# Patient Record
Sex: FEMALE | Race: WHITE | NOT HISPANIC OR LATINO | URBAN - METROPOLITAN AREA
[De-identification: names, ages, dates, MRNs, and addresses within clinical notes are randomized per-mention and may not be internally consistent; named-entity substitution may affect disease eponyms.]

---
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
## 2021-05-26 NOTE — ED PROVIDER NOTE - CONSTITUTIONAL NEGATIVE STATEMENT, MLM
[facility]  [facility]  [address]  [address]  Telephone:  [phone]      5/26/2021    RE: Dipti Cutler, 1992      To whom it may concern;    Dipti is under my care for pregnancy with an estimated delivery date of 9/13/2021.    Dipti is seeking dental care. If treatment is necessary I recommend the following:    · Avoid X-rays, but if necessary, please shield the patient's abdomen with one lead apron.  · If anesthesia is needed, please use a local anesthetic.  · If antibiotics are needed, she may receive  Penicillin, Cephalosporins, Amoxicillin or Clindamycin, provided that she is not allergic.  · If pain medications are necessary, she may take Tylenol ES as directed, PRN. For severe pain, Tylenol #3, Vicodin or Percocet can be prescribed.  ·  Minor dental procedures can be done if the above guidelines are followed. Procedures are up to the discretion of the dentist.    For further questions, please feel free to call our office at [phone]. Thank you.    Sincerely,      Brandy MOREIRA for Linnette Colby NP                     .                                  
+fever/chills

## 2023-02-02 PROBLEM — Z00.00 ENCOUNTER FOR PREVENTIVE HEALTH EXAMINATION: Status: ACTIVE | Noted: 2023-02-02

## 2023-02-07 ENCOUNTER — APPOINTMENT (OUTPATIENT)
Dept: RHEUMATOLOGY | Facility: CLINIC | Age: 23
End: 2023-02-07
Payer: COMMERCIAL

## 2023-02-07 VITALS
WEIGHT: 160 LBS | DIASTOLIC BLOOD PRESSURE: 80 MMHG | SYSTOLIC BLOOD PRESSURE: 120 MMHG | BODY MASS INDEX: 29.44 KG/M2 | HEART RATE: 107 BPM | OXYGEN SATURATION: 99 % | HEIGHT: 62 IN

## 2023-02-07 DIAGNOSIS — Z78.9 OTHER SPECIFIED HEALTH STATUS: ICD-10-CM

## 2023-02-07 DIAGNOSIS — M54.50 LOW BACK PAIN, UNSPECIFIED: ICD-10-CM

## 2023-02-07 DIAGNOSIS — Z80.9 FAMILY HISTORY OF MALIGNANT NEOPLASM, UNSPECIFIED: ICD-10-CM

## 2023-02-07 DIAGNOSIS — Z82.61 FAMILY HISTORY OF ARTHRITIS: ICD-10-CM

## 2023-02-07 PROCEDURE — 99204 OFFICE O/P NEW MOD 45 MIN: CPT

## 2023-02-07 RX ORDER — CETIRIZINE HCL 10 MG
10 TABLET ORAL
Refills: 0 | Status: ACTIVE | COMMUNITY

## 2023-02-07 RX ORDER — ERGOCALCIFEROL 1.25 MG/1
1.25 MG CAPSULE, LIQUID FILLED ORAL
Refills: 0 | Status: ACTIVE | COMMUNITY

## 2023-02-07 NOTE — REASON FOR VISIT
[Initial Evaluation] : an initial evaluation [FreeTextEntry1] : Pain in the low back, hips, knees and elbows x 1.5 years

## 2023-02-07 NOTE — HISTORY OF PRESENT ILLNESS
[FreeTextEntry1] : In May 2021, at the end of her dino year of college, pt developed pain in her knee. She went to an urgent care, was told it was bursitis, then saw two orthopedic surgeons and had an MRI of her knee, was told she has patellofemoral syndrome by one and a congenital abnormality by another, and was sent to physical therapy. However, she subsequently developed pain in her hips, ankles, and low back, and then pain in her elbows. She thinks the rest of the pain may have been exacerbated by PT exercises. She saw another orthopedic surgeon, was told she has ligamentous laxity and was sent for additional PT, which she thinks has helped somewhat but sometimes she has flares of pain. Generally, the pain is worse after activity, although sometimes she has elbow pain when she wakes up. + Worse knee pain with standing. Sometimes, she feels knee and hip pressure when walking, like her joints will dislocate. + Fatigue. + Baseline high temperature for at least a few years, sometimes up to 101F. Pt denies difficulty sleeping, Raynaud's, rashes. + Occasional dry mouth. + IBS.\par \par Physical exam: GEN: Pleasant, AAO woman sitting on exam table in NAD\par SKIN: no rashes\par HEAD: no alopecia\par MOUTH: Moist mucous membranes, no oral ulcers, normal oral aperture\par ENT: no LAD\par PULM: Clear to auscultation b/l\par CV: Regular rate and rhythm, no murmurs\par MSK:\par Shoulders: Full ROM b/l\par Elbows: + Hypermobility b/l, no effusions\par Wrists: Full ROM b/l, no effusions\par Hands: + Hypermobility in thumbs b/l\par Hips: Full ROM b/l\par Knees: no effusions, full ROM b/l, no hypermobility\par Ankles: no effusions, full ROM b/l\par Feet: no effusions, no TTP\par Back: no hypermobility\par EXT: Normal nailfold capillaries, no nail changes

## 2023-02-07 NOTE — ASSESSMENT
[FreeTextEntry1] : Chronic knee, hip, low back, ankle, elbow pain: Overall, pt's symptoms are suggestive of a mechanical etiology as opposed to an autoimmune or inflammatory process. She does have some e/o hypermobility on her joint exam today, especially in her hands and elbows. Pt previously had labs in 6/2022 which demonstrated negative RF, CCP, DIMITRIOS, TSH, Lyme, normal vitamin B12, normal CBC and CMP.\par - f/u additional labs for inflammatory arthritis, although low suspicion as above\par - f/u b/l hip and pelvis x-ray to rule out sacroiliitis\par - Advised pt to avoid overextending her joints, and that low-impact exercise may be helpful

## 2023-03-31 ENCOUNTER — LABORATORY RESULT (OUTPATIENT)
Age: 23
End: 2023-03-31

## 2023-04-10 ENCOUNTER — NON-APPOINTMENT (OUTPATIENT)
Age: 23
End: 2023-04-10

## 2023-04-10 LAB
ALBUMIN MFR SERPL ELPH: 57.2 %
ALBUMIN SERPL-MCNC: 4.1 G/DL
ALBUMIN/GLOB SERPL: 1.4 RATIO
ALPHA1 GLOB MFR SERPL ELPH: 4.5 %
ALPHA1 GLOB SERPL ELPH-MCNC: 0.3 G/DL
ALPHA2 GLOB MFR SERPL ELPH: 10.3 %
ALPHA2 GLOB SERPL ELPH-MCNC: 0.7 G/DL
B-GLOBULIN MFR SERPL ELPH: 12.9 %
B-GLOBULIN SERPL ELPH-MCNC: 0.9 G/DL
CK SERPL-CCNC: 91 U/L
CRP SERPL-MCNC: 5 MG/L
ENA SS-A AB SER IA-ACNC: <0.2 AL
ENA SS-B AB SER IA-ACNC: <0.2 AL
ERYTHROCYTE [SEDIMENTATION RATE] IN BLOOD BY WESTERGREN METHOD: 12 MM/HR
GAMMA GLOB FLD ELPH-MCNC: 1.1 G/DL
GAMMA GLOB MFR SERPL ELPH: 15.1 %
HETEROPH AB SER QL: NEGATIVE
HLA-B27 RELATED AG QL: NEGATIVE
INTERPRETATION SERPL IEP-IMP: NORMAL
M PROTEIN SPEC IFE-MCNC: NORMAL
M TB IFN-G BLD-IMP: ABNORMAL
PROT SERPL-MCNC: 7.1 G/DL
PROT SERPL-MCNC: 7.1 G/DL
QUANTIFERON TB PLUS MITOGEN MINUS NIL: 0.12 IU/ML
QUANTIFERON TB PLUS NIL: 0.02 IU/ML
QUANTIFERON TB PLUS TB1 MINUS NIL: 0 IU/ML
QUANTIFERON TB PLUS TB2 MINUS NIL: -0.01 IU/ML

## 2023-04-11 DIAGNOSIS — R50.9 FEVER, UNSPECIFIED: ICD-10-CM

## 2023-04-11 DIAGNOSIS — M25.552 PAIN IN RIGHT HIP: ICD-10-CM

## 2023-04-11 DIAGNOSIS — M25.551 PAIN IN RIGHT HIP: ICD-10-CM

## 2023-04-11 DIAGNOSIS — M25.562 PAIN IN RIGHT KNEE: ICD-10-CM

## 2023-04-11 DIAGNOSIS — M25.521 PAIN IN RIGHT ELBOW: ICD-10-CM

## 2023-04-11 DIAGNOSIS — M25.561 PAIN IN RIGHT KNEE: ICD-10-CM

## 2023-04-11 DIAGNOSIS — M25.522 PAIN IN RIGHT ELBOW: ICD-10-CM

## 2023-04-17 ENCOUNTER — NON-APPOINTMENT (OUTPATIENT)
Age: 23
End: 2023-04-17

## 2023-04-17 LAB
M TB IFN-G BLD-IMP: NEGATIVE
QUANTIFERON TB PLUS MITOGEN MINUS NIL: 2.59 IU/ML
QUANTIFERON TB PLUS NIL: 0.02 IU/ML
QUANTIFERON TB PLUS TB1 MINUS NIL: -0.01 IU/ML
QUANTIFERON TB PLUS TB2 MINUS NIL: -0.01 IU/ML

## 2023-04-20 ENCOUNTER — APPOINTMENT (OUTPATIENT)
Dept: OBGYN | Facility: CLINIC | Age: 23
End: 2023-04-20
Payer: COMMERCIAL

## 2023-04-20 VITALS — BODY MASS INDEX: 28.52 KG/M2 | TEMPERATURE: 98.2 F | WEIGHT: 155 LBS | HEIGHT: 62 IN

## 2023-04-20 DIAGNOSIS — Z01.419 ENCOUNTER FOR GYNECOLOGICAL EXAMINATION (GENERAL) (ROUTINE) W/OUT ABNORMAL FINDINGS: ICD-10-CM

## 2023-04-20 PROCEDURE — 99385 PREV VISIT NEW AGE 18-39: CPT

## 2023-04-20 NOTE — PHYSICAL EXAM
[Appropriately responsive] : appropriately responsive [Alert] : alert [No Acute Distress] : no acute distress [No Lymphadenopathy] : no lymphadenopathy [Regular Rate Rhythm] : regular rate rhythm [No Murmurs] : no murmurs [Clear to Auscultation B/L] : clear to auscultation bilaterally [Non-tender] : non-tender [Soft] : soft [Non-distended] : non-distended [No HSM] : No HSM [No Lesions] : no lesions [No Mass] : no mass [Oriented x3] : oriented x3 [Examination Of The Breasts] : a normal appearance [No Masses] : no breast masses were palpable [Labia Majora] : normal [Labia Minora] : normal [Normal] : normal [Uterine Adnexae] : normal [FreeTextEntry4] : hymenal ring intact. pediatric speculum used

## 2023-04-20 NOTE — HISTORY OF PRESENT ILLNESS
[FreeTextEntry1] : 23 yo virginal G0 w/ LMP 3/23/23 here for initial GYn exam. Menarche age 11. Has regular, non painful menses. Does report significant depressive symptoms for the week prior to menstruation. Has these symptoms daily but much worse that week. No abdominal pain or urinary complaints. Took OCPs in high school and did not like them (mood swings and motor side effects?). \par going to grad school for theology\par \par s/p gardasil vaccine series\par never had a pap\par \par Ob hx: G0\par GYN hx denies cysts, fibroids, abnormal paps, STIs\par PMH denies\par meds zyrtex, vit D\par NKDA\par PSH denies\par social denies\par fam hx paternal grandmother cervical cancer, maternal aunt breast cancer

## 2023-04-20 NOTE — DISCUSSION/SUMMARY
[FreeTextEntry1] : 21 yo virginal G0, likely pre-menstrual dysphoric disorder, new patient exam.\par \par - f/up pap\par - discussed hormonal birth control vs antidepressant for PMDD.. Patient will call if she desires one or the other\par - health care maintenance with PCP\par - declined STI screening\par - return to office 1 year annaual exam or PRN

## 2023-04-25 LAB — CYTOLOGY CVX/VAG DOC THIN PREP: NORMAL

## 2025-02-20 ENCOUNTER — NON-APPOINTMENT (OUTPATIENT)
Age: 25
End: 2025-02-20